# Patient Record
(demographics unavailable — no encounter records)

---

## 2025-02-14 NOTE — REVIEW OF SYSTEMS
[As Noted in HPI] : as noted in HPI [Skin Lesions] : skin lesion [Skin Wound] : skin wound [Negative] : Heme/Lymph [de-identified] : Chronic pilonidal cyst with chronic drainage

## 2025-02-14 NOTE — PHYSICAL EXAM
[Abdomen Masses] : No abdominal masses [Abdomen Tenderness] : ~T No ~M abdominal tenderness [Tender] : nontender [Multiple Sinus Tracts] : multiple perianal sinus tracts [Pilonidal Cyst] : a pilonidal cyst [Pilonidal Sinus] : a pilonidal sinus [Pilonidal Sinus Draining] : pilonidal sinus drainage [JVD] : no jugular venous distention  [Normal Breath Sounds] : Normal breath sounds [Normal Heart Sounds] : normal heart sounds [Normal Rate and Rhythm] : normal rate and rhythm [No Rash or Lesion] : No rash or lesion [Alert] : alert [Oriented to Person] : oriented to person [Oriented to Place] : oriented to place [Oriented to Time] : oriented to time [Calm] : calm [de-identified] : Looks well in no distress, of stated age. [de-identified] : Pupils equal reactive to light normocephalic atraumatic.

## 2025-02-14 NOTE — HISTORY OF PRESENT ILLNESS
[FreeTextEntry1] : 33-year-old male whose wife is a nurse at [employer].  He has been dealing with a chronic pilonidal cyst for many years symptoms of drainage and pain have been worsening.

## 2025-02-14 NOTE — ASSESSMENT
[FreeTextEntry1] : 33-year-old male with chronic pilonidal cyst symptomatic with pain and drainage.  Recommend surgical excision.  Discussed options of primary closure versus secondary intention with packing possible VAC.  Discussed risk and benefits including pain bleeding nonhealing wound needed for reoperations recurrence.  Patient will require presurgical testing with CBC BMP PT PTT prophylactic antibiotics DVT prophylaxis also discussed the need to keep hair out of the area long-term

## 2025-03-26 NOTE — ASSESSMENT
[FreeTextEntry1] : 33-year-old male recovering well from excision of pilonidal cyst with flap. Advised to keep area clean and dry. Avoid heavy lifting for another 2 weeks. Keep area free of hair. Follow up with Dr. Woo in 4 weeks.

## 2025-03-26 NOTE — PHYSICAL EXAM
[No Rash or Lesion] : No rash or lesion [Alert] : alert [Oriented to Person] : oriented to person [Oriented to Place] : oriented to place [Oriented to Time] : oriented to time [Calm] : calm [de-identified] : pilonidal excision site healing well, Sutures intact, no erythema, no drainage noted.  [de-identified] : well appearing, no distress noted, [de-identified] : moves all extremities without difficulty.

## 2025-03-26 NOTE — HISTORY OF PRESENT ILLNESS
[FreeTextEntry1] : 33-year-old male with history of chronic recurrent pilonidal cyst now s/p wide excision of pilonidal cyst with myofascial cutaneous flap. Patient is doing well. Denies fever/chills.

## 2025-03-26 NOTE — DATA REVIEWED
[FreeTextEntry1] :  	 [facility] Accession Number : [id number] Patient:     LEELA WALKER   Accession:                             [id number]  Collected Date/Time:                   3/10/2025 08:53 EDT Received Date/Time:                    3/10/2025 12:14 EDT  Surgical Pathology Report - Auth (Verified)  Specimen(s) Submitted 1  Wide excision of chronic complicated pilonidal cyst  Final Diagnosis Skin (wide excision, chronic complicated pilonidal cyst): -   Marked acute and chronic inflammation, abscess formation, granulation tissue, fibrosis and significant reactive changes compatible with  pilonidal cyst. Verified by: MARYBETH JAVED M.D. (Electronic Signature) Reported on: 03/17/25 15:49 EDT, [facility], [address] [place] [address] Phone: [phone]   Fax: [phone] _________________________________________________________________   Clinical Information Chronic pilonidal cyst.  Gross Description Received  in formalin labeled  "wide excision of chronic complicated pilonidal cyst"  is a 5.7 x 1.5 cm portion of tan, semielliptical, unoriented skin that is taken to a depth of up to 2.5 cm.  The skin surface displays a 1.0 x 0.8 cm nodular, firm area that is 0.2 cm from the nearest peripheral skin edge.  The remaining skin surface is wrinkled, and slightly puckered.  Sectioning reveals that the firm area corresponds to a 4.5 cm in greatest dimension yellow-orange, ill-defined cystic area surrounded by abundant fibrosis.  The remaining subcutaneous tissue is tan-yellow and soft. Representative sections in one cassette: 1A  TOMAS Vaughan (ASCP) 03/11/2025 04:33 PM  Disclaimer In addition to other data that may appear on the specimen containers, all labels have been inspected to confirm the presence of the patient's name and date of birth. [email or web address] YES [phone]